# Patient Record
Sex: MALE | Race: WHITE | Employment: FULL TIME | URBAN - NONMETROPOLITAN AREA
[De-identification: names, ages, dates, MRNs, and addresses within clinical notes are randomized per-mention and may not be internally consistent; named-entity substitution may affect disease eponyms.]

---

## 2020-04-24 ENCOUNTER — HOSPITAL ENCOUNTER (EMERGENCY)
Age: 30
Discharge: HOME | End: 2020-04-24
Payer: COMMERCIAL

## 2020-04-24 VITALS
BODY MASS INDEX: 31.5 KG/M2 | SYSTOLIC BLOOD PRESSURE: 145 MMHG | OXYGEN SATURATION: 98 % | WEIGHT: 225 LBS | HEIGHT: 71 IN | DIASTOLIC BLOOD PRESSURE: 76 MMHG | TEMPERATURE: 98.3 F | HEART RATE: 78 BPM | RESPIRATION RATE: 16 BRPM

## 2020-04-24 PROCEDURE — 99202 OFFICE O/P NEW SF 15 MIN: CPT | Performed by: NURSE PRACTITIONER

## 2020-04-24 PROCEDURE — 99202 OFFICE O/P NEW SF 15 MIN: CPT

## 2020-04-24 RX ORDER — PROPARACAINE HYDROCHLORIDE 5 MG/ML
SOLUTION/ DROPS OPHTHALMIC
Status: DISCONTINUED
Start: 2020-04-24 | End: 2020-04-24 | Stop reason: HOSPADM

## 2020-04-24 RX ORDER — PROPARACAINE HYDROCHLORIDE 5 MG/ML
2 SOLUTION/ DROPS OPHTHALMIC ONCE
Status: DISCONTINUED | OUTPATIENT
Start: 2020-04-24 | End: 2020-04-24 | Stop reason: HOSPADM

## 2020-04-24 RX ORDER — TOBRAMYCIN 3 MG/ML
1 SOLUTION/ DROPS OPHTHALMIC EVERY 6 HOURS
Qty: 1 BOTTLE | Refills: 0 | Status: SHIPPED | OUTPATIENT
Start: 2020-04-24 | End: 2020-05-01

## 2020-04-24 ASSESSMENT — ENCOUNTER SYMPTOMS
EYE PAIN: 1
EYE ITCHING: 0
EYE DISCHARGE: 0
PHOTOPHOBIA: 0
EYE REDNESS: 1

## 2020-04-24 ASSESSMENT — PAIN SCALES - GENERAL: PAINLEVEL_OUTOF10: 8

## 2020-04-24 ASSESSMENT — VISUAL ACUITY
OD: 20/50
OS: 20/30
OU: 1

## 2020-04-24 ASSESSMENT — PAIN DESCRIPTION - FREQUENCY: FREQUENCY: CONTINUOUS

## 2020-04-24 ASSESSMENT — PAIN DESCRIPTION - LOCATION: LOCATION: EYE

## 2020-04-24 ASSESSMENT — PAIN DESCRIPTION - ORIENTATION: ORIENTATION: RIGHT

## 2020-04-24 ASSESSMENT — PAIN DESCRIPTION - PAIN TYPE: TYPE: ACUTE PAIN

## 2021-12-22 ENCOUNTER — HOSPITAL ENCOUNTER (EMERGENCY)
Age: 31
Discharge: HOME | End: 2021-12-22
Payer: COMMERCIAL

## 2021-12-22 VITALS
OXYGEN SATURATION: 98 % | HEART RATE: 78 BPM | RESPIRATION RATE: 16 BRPM | DIASTOLIC BLOOD PRESSURE: 80 MMHG | SYSTOLIC BLOOD PRESSURE: 140 MMHG | TEMPERATURE: 98.3 F

## 2021-12-22 DIAGNOSIS — J06.9 VIRAL URI WITH COUGH: Primary | ICD-10-CM

## 2021-12-22 DIAGNOSIS — Z20.822 ENCOUNTER FOR LABORATORY TESTING FOR COVID-19 VIRUS: ICD-10-CM

## 2021-12-22 PROCEDURE — 99213 OFFICE O/P EST LOW 20 MIN: CPT

## 2021-12-22 PROCEDURE — 87636 SARSCOV2 & INF A&B AMP PRB: CPT

## 2021-12-22 PROCEDURE — 99213 OFFICE O/P EST LOW 20 MIN: CPT | Performed by: NURSE PRACTITIONER

## 2021-12-22 ASSESSMENT — ENCOUNTER SYMPTOMS
VOMITING: 0
COUGH: 0
BACK PAIN: 0
SORE THROAT: 0
DIARRHEA: 0
SINUS PRESSURE: 0
TROUBLE SWALLOWING: 0
SHORTNESS OF BREATH: 0
NAUSEA: 0
RHINORRHEA: 0

## 2021-12-22 NOTE — ED NOTES
Patient understood instructions verbally,  Follow up with PCP with any concerns, work excuse with patient. Worse URI symptoms follow up with ED.ambulated self to lobby,stable condition. All belongings with patient.      Tim Galicia LPN  98/55/54 [id number]

## 2021-12-22 NOTE — ED PROVIDER NOTES
[place] [address]  Urgent Care Encounter       CHIEF COMPLAINT       Chief Complaint   Patient presents with    Cough    Congestion    Covid Testing       Nurses Notes reviewed and I agree except as noted in the HPI. HISTORY OF PRESENT ILLNESS   Mary Kay Munoz Carrier is a 27 y.o. male who presents urgent care center for a Covid test.  Patient stated that his wife tested positive he tested negative last night. Patient stated that he has had a slight cough but no fever, chills loss of taste or smell or any other symptoms. Patient requesting a Covid test.  (See HPI template)    The history is provided by the patient. No  was used. Cough  Cough characteristics:  Non-productive  Severity:  Mild  Onset quality:  Sudden  Duration:  3 days  Timing:  Constant  Progression:  Unchanged  Chronicity:  New  Smoker: no    Context: sick contacts    Context comment:  Wife has COVID  Relieved by:  Nothing  Worsened by:  Nothing  Ineffective treatments:  None tried  Associated symptoms: no chest pain, no chills, no ear pain, no fever, no headaches, no myalgias, no rash, no rhinorrhea, no shortness of breath and no sore throat        REVIEW OF SYSTEMS     Review of Systems   Constitutional: Negative for activity change, appetite change, chills, fatigue and fever. HENT: Positive for congestion. Negative for ear pain, rhinorrhea, sinus pressure, sore throat and trouble swallowing. Respiratory: Negative for cough and shortness of breath. Cardiovascular: Negative for chest pain. Gastrointestinal: Negative for diarrhea, nausea and vomiting. Musculoskeletal: Negative for back pain, myalgias and neck stiffness. Skin: Negative for rash. Allergic/Immunologic: Negative for environmental allergies. Neurological: Negative for dizziness, light-headedness and headaches. Hematological: Negative for adenopathy. PAST MEDICAL HISTORY   History reviewed.  No pertinent past medical history. SURGICALHISTORY     Patient  has no past surgical history on file. CURRENT MEDICATIONS       There are no discharge medications for this patient. ALLERGIES     Patient is has No Known Allergies. Patients   There is no immunization history on file for this patient. FAMILY HISTORY     Patient's family history is not on file. SOCIAL HISTORY     Patient  reports that he has never smoked. He has never used smokeless tobacco. He reports current alcohol use. He reports that he does not use drugs. PHYSICAL EXAM     ED TRIAGE VITALS  BP: (!) 140/80, Temp: 98.3 °F (36.8 °C), Pulse: 78, Resp: 16, SpO2: 98 %,Estimated body mass index is 31.38 kg/m² as calculated from the following:    Height as of 4/24/20: 5' 11\" (1.803 m). Weight as of 4/24/20: 225 lb (102.1 kg). ,No LMP for male patient. Physical Exam  Vitals and nursing note reviewed. Constitutional:       General: He is not in acute distress. Appearance: He is well-developed. He is not ill-appearing, toxic-appearing or diaphoretic. HENT:      Head: Normocephalic. Right Ear: Hearing, tympanic membrane, ear canal and external ear normal. Tympanic membrane is not erythematous. Left Ear: Hearing, tympanic membrane, ear canal and external ear normal. Tympanic membrane is not erythematous. Nose: No congestion or rhinorrhea. Right Turbinates: Not enlarged or swollen. Left Turbinates: Not enlarged or swollen. Mouth/Throat:      Lips: Pink. Mouth: Mucous membranes are moist.      Tongue: No lesions. Pharynx: Oropharynx is clear. Uvula midline. No pharyngeal swelling, oropharyngeal exudate, posterior oropharyngeal erythema or uvula swelling. Tonsils: No tonsillar exudate or tonsillar abscesses. Eyes:      Conjunctiva/sclera: Conjunctivae normal.      Pupils: Pupils are equal, round, and reactive to light. Cardiovascular:      Rate and Rhythm: Normal rate and regular rhythm.       Heart sounds: Normal heart sounds, S1 normal and S2 normal.   Pulmonary:      Effort: Pulmonary effort is normal. No accessory muscle usage. Breath sounds: Normal breath sounds. No decreased air movement. No decreased breath sounds, wheezing, rhonchi or rales. Musculoskeletal:      Cervical back: Full passive range of motion without pain, normal range of motion and neck supple. No rigidity. Lymphadenopathy:      Head:      Right side of head: No submental, submandibular, tonsillar, preauricular, posterior auricular or occipital adenopathy. Left side of head: No submental, submandibular, tonsillar, preauricular, posterior auricular or occipital adenopathy. Cervical: No cervical adenopathy. Right cervical: No superficial, deep or posterior cervical adenopathy. Left cervical: No superficial, deep or posterior cervical adenopathy. Skin:     General: Skin is warm and dry. Capillary Refill: Capillary refill takes less than 2 seconds. Neurological:      General: No focal deficit present. Mental Status: He is alert and oriented to person, place, and time. Psychiatric:         Mood and Affect: Mood normal.         Speech: Speech normal.         Behavior: Behavior normal. Behavior is cooperative. DIAGNOSTIC RESULTS     Labs:No results found for this visit on 12/22/21. IMAGING:    No orders to display         EKG:      URGENT CARE COURSE:     Vitals:    12/22/21 1143   BP: (!) 140/80   Pulse: 78   Resp: 16   Temp: 98.3 °F (36.8 °C)   TempSrc: Temporal   SpO2: 98%       Medications - No data to display         PROCEDURES:  None    FINAL IMPRESSION      1. Viral URI with cough    2. Encounter for laboratory testing for COVID-19 virus          DISPOSITION/ PLAN      The patient/Patient representative was advised to rest, drink lots of fluids, take Motrin and Tylenol for any fever, chills generalized body aches.   The patient was also advised to monitor urine output for signs of dehydration. If the patient develops any chest pain, shortness of breath, neck pain or stiffness or abdominal pain or any other concerns, the patient is to call 911 or go to the emergency department for further evaluation. If the patient does not experience any of the above symptoms he is to follow-up with his primary care provider in 2-3 days for reevaluation. The patient/Patient representative are agreeable to the treatment plan at this time. The patient left the urgent care center in stable condition. PATIENT REFERRED TO:  No primary care provider on file. No primary physician on file. DISCHARGE MEDICATIONS:  There are no discharge medications for this patient. There are no discharge medications for this patient. There are no discharge medications for this patient.       MITCH Templeton CNP    (Please note that portions of this note were completed with a voice recognition program. Efforts were made to edit the dictations but occasionally words are mis-transcribed.)           MITCH Templeton CNP  12/22/21 0631

## 2021-12-22 NOTE — ED TRIAGE NOTES
Elver Ramos arrives to room with complaint of cough congestion exposure to covid symptoms started 3 days ago. Elver Ramos wife has covid along with his child he did a home test last night that was neg he would like a pcr test to be certain he doesn't have covid.

## 2021-12-22 NOTE — LETTER
[facility]  [address] [place] [id number]  Phone: [phone]               December 22, 2021    Patient: Jeanine Baum   YOB: 1990   Date of Visit: 12/22/2021       To Whom It May Concern:    Madeleine Roth was seen and treated in our emergency department on 12/22/2021. He     The results may take 24 hours. If the patient test positive, they should be off work 10 days from the start of symptoms and fever free for 24 hours. If the patient needs FMLA papers filled out they must go through their primary care provider. If the results are negative, they can return to work with no restrictions. If there are questions or concerns, please have the patient contact our office.         Sincerely,       MITCH Grimm CNP         Signature:_____Electronically signed by MITCH Grimm CNP on 12/22/2021 at 12:08 PM]_____________________________

## 2021-12-23 ENCOUNTER — CARE COORDINATION (OUTPATIENT)
Dept: CARE COORDINATION | Age: 31
End: 2021-12-23

## 2021-12-23 LAB
INFLUENZA A: NOT DETECTED
INFLUENZA B: NOT DETECTED
SARS-COV-2 RNA, RT PCR: DETECTED

## 2021-12-23 NOTE — CARE COORDINATION
Attempted to reach patient for covid-19 f/u s/p recent UC visit for c/o cough, congestion and wishes for COVID-19 testing s/p recent exposure. COVID-19 testing was completed and results were positive. Patient was not available at the time of my call and generic voicemail message was left asking patient to please return call to my direct number. Will continue to attempt to f/u with patient in the future.

## 2021-12-23 NOTE — CARE COORDINATION
Patient contacted regarding COVID-19 exposure and diagnosis. Discussed COVID-19 related testing which was available at this time. Test results were positive. Patient informed of results, if available? Reviewed positive COVID-19 results with patient during our f/u call. Ambulatory Care Manager contacted the patient by telephone to perform post discharge assessment. Call within 2 business days of discharge: Yes. Verified name and  with patient as identifiers. Provided introduction to self, and explanation of the CTN/ACM role, and reason for call due to risk factors for infection and/or exposure to COVID-19. Symptoms reviewed with patient who verbalized the following symptoms: fatigue, cough, shortness of breath, no new symptoms and no worsening symptoms. Due to no new or worsening symptoms encounter was not routed to provider for escalation. Discussed follow-up appointments. If no appointment was previously scheduled, appointment scheduling offered: Patient agreeable to PCP f/u appointment and will ask Alfredito Bryant, to reach out to patient and assist with scheduling. [facility] follow up appointment(s): No future appointments. Non-[facility] follow up appointment(s): N/A     Non-face-to-face services provided:  Obtained and reviewed discharge summary and/or continuity of care documents  Education of patient/family/caregiver/guardian to support self-management-COVID-19 education and zone information reviewed with patient. Patient educated on signs/symptoms to report as well as the importance of early symptom recognition. ACM educated on the need to self quarantine as directed and encouraged patient to f/u with the health department. Advance Care Planning:   Does patient have an Advance Directive:  reviewed and current. ACP note completed. Educated patient about risk for severe COVID-19 due to risk factors according to CDC guidelines.  ACM reviewed discharge instructions, medical action plan and red flag symptoms with the patient who verbalized understanding. Discussed COVID vaccination status: No.   Discussed exposure protocols and quarantine with CDC Guidelines. Patient was given an opportunity to verbalize any questions and concerns and agrees to contact ACM or health care provider for questions related to their healthcare. Reviewed and educated patient on any new and changed medications related to discharge diagnosis     Was patient discharged with a pulse oximeter? No     ACM provided contact information. Plan for follow-up call in 3-5 days based on severity of symptoms and risk factors. Patient called for covid-19 f/u s/p recent  visit for c/o cough, congestion, fatigue, and COVID-19 testing s/p recent exposure. Patient denied any new/change/worsening of symptoms. Patient shared he is feeling \"about the same\" and he continues to have c/o congestion and increased fatigue. COVID-19 education and zone information was reviewed with patient. Patient was educated on signs/symptoms to report as well as the importance of early symptom recognition. Patient verbalized understanding. ACM reviewed need to self quarantine as directed and patient acknowledged understanding. Patient was reminded to call covid-19 hotline number with any new symptoms or questions/concerns. Patient verbalized understanding. Patient denied any other questions, concerns, or needs.

## 2021-12-27 ENCOUNTER — CARE COORDINATION (OUTPATIENT)
Dept: CARE COORDINATION | Age: 31
End: 2021-12-27

## 2021-12-27 NOTE — CARE COORDINATION
You Patient resolved from the Care Transitions episode on 12/27/2021  Discussed COVID-19 related testing which was available at this time. Test results were positive. Patient informed of results, if available? Yes    Patient/family has been provided the following resources and education related to COVID-19:                         Signs, symptoms and red flags related to COVID-19            CDC exposure and quarantine guidelines            [facility] exposure contact - [phone]            Contact for their local Department of Health                 Patient currently reports that the following symptoms have improved:  nasal congestion and no new/worsening symptoms     No further outreach scheduled with this CTN/ACM. Episode of Care resolved. Patient has this CTN/ACM contact information if future needs arise.

## 2021-12-30 ENCOUNTER — OFFICE VISIT (OUTPATIENT)
Dept: FAMILY MEDICINE CLINIC | Age: 31
End: 2021-12-30
Payer: COMMERCIAL

## 2021-12-30 DIAGNOSIS — U07.1 COVID-19: Primary | ICD-10-CM

## 2021-12-30 PROCEDURE — 99203 OFFICE O/P NEW LOW 30 MIN: CPT | Performed by: NURSE PRACTITIONER

## 2021-12-30 NOTE — PROGRESS NOTES
2021    TELEHEALTH EVALUATION -- Audio/Visual (During WSLHD-80 public health emergency)    HPI:    Pinky Ortega Carrier (:  1990) has requested an audio/video evaluation for   Chief Complaint   Patient presents with   [place] follow up   :        HPI:      Patient tested positive for COVID on  at [address]. Symptoms have been present for 11 day(s). Symptoms are better since they initially started. Fever? No  Runny nose or congestion? Yes   Cough? No  Sore throat? No  Shortness of breath/Wheezing? No  Other associated symptoms?  none      Smoker? No  Preexisting Respiratory conditions?  none        There is no problem list on file for this patient. No Known Allergies    No current outpatient medications on file prior to visit. No current facility-administered medications on file prior to visit. PHYSICAL EXAMINATION:  Appearance: alert, well appearing, and in no distress, normal appearing weight and well hydrated. Psych:  Affect appropriate. Thought process is normal without evidence of depression or psychosis. Good insight and appropriae interaction. Cognition and memory appear to be intact. ASSESSMENT & PLAN  Umesh Nicholson was seen today for other. Diagnoses and all orders for this visit:    COVID-19      Symptoms have mostly resolved  Follow up prn    No follow-ups on file.    -Pt appears stable at this time, will continue outpatient tx  -Continue current treatments including OTC supplements (Zinc, Vit D, C)  -Patient advised to contact our office immediately if symptoms worsen or persist.  ER precautions given to patient today  -Patient counseled on conservative care including fluids, rest and OTC meds    An  electronic signature was used to authenticate this note.     --La Corbett, MITCH - CNP on 2021 at 11:56 AM    {    Pursuant to the emergency declaration under the [facility]5 waiver authority and the Coronavirus Preparedness and Response Supplemental Appropriations Act, this Virtual  Visit was conducted, with patient's consent, to reduce the patient's risk of exposure to COVID-19 and provide continuity of care for an established patient. Services were provided through a video synchronous discussion virtually to substitute for in-person clinic visit.

## 2022-02-17 ENCOUNTER — HOSPITAL ENCOUNTER (EMERGENCY)
Age: 32
Discharge: HOME | End: 2022-02-17
Payer: COMMERCIAL

## 2022-02-17 VITALS
RESPIRATION RATE: 16 BRPM | DIASTOLIC BLOOD PRESSURE: 78 MMHG | TEMPERATURE: 98.4 F | OXYGEN SATURATION: 98 % | HEART RATE: 78 BPM | SYSTOLIC BLOOD PRESSURE: 122 MMHG

## 2022-02-17 DIAGNOSIS — S61.211A LACERATION WITHOUT FOREIGN BODY OF LEFT INDEX FINGER WITHOUT DAMAGE TO NAIL, INITIAL ENCOUNTER: Primary | ICD-10-CM

## 2022-02-17 PROCEDURE — 90471 IMMUNIZATION ADMIN: CPT | Performed by: EMERGENCY MEDICINE

## 2022-02-17 PROCEDURE — 12001 RPR S/N/AX/GEN/TRNK 2.5CM/<: CPT | Performed by: EMERGENCY MEDICINE

## 2022-02-17 PROCEDURE — 90715 TDAP VACCINE 7 YRS/> IM: CPT | Performed by: EMERGENCY MEDICINE

## 2022-02-17 PROCEDURE — 12001 RPR S/N/AX/GEN/TRNK 2.5CM/<: CPT

## 2022-02-17 PROCEDURE — 6370000000 HC RX 637 (ALT 250 FOR IP): Performed by: EMERGENCY MEDICINE

## 2022-02-17 PROCEDURE — 6360000002 HC RX W HCPCS: Performed by: EMERGENCY MEDICINE

## 2022-02-17 PROCEDURE — 99213 OFFICE O/P EST LOW 20 MIN: CPT

## 2022-02-17 RX ORDER — DIAPER,BRIEF,INFANT-TODD,DISP
EACH MISCELLANEOUS ONCE
Status: COMPLETED | OUTPATIENT
Start: 2022-02-17 | End: 2022-02-17

## 2022-02-17 RX ORDER — LIDOCAINE HYDROCHLORIDE 10 MG/ML
5 INJECTION, SOLUTION INFILTRATION; PERINEURAL ONCE
Status: DISCONTINUED | OUTPATIENT
Start: 2022-02-17 | End: 2022-02-17 | Stop reason: HOSPADM

## 2022-02-17 RX ADMIN — BACITRACIN ZINC: 500 OINTMENT TOPICAL at 10:39

## 2022-02-17 RX ADMIN — TETANUS TOXOID, REDUCED DIPHTHERIA TOXOID AND ACELLULAR PERTUSSIS VACCINE, ADSORBED 0.5 ML: 5; 2.5; 8; 8; 2.5 SUSPENSION INTRAMUSCULAR at 10:01

## 2022-02-17 ASSESSMENT — ENCOUNTER SYMPTOMS
COUGH: 0
SHORTNESS OF BREATH: 0

## 2022-02-17 NOTE — ED PROVIDER NOTES
[facility]       CHIEF COMPLAINT       Chief Complaint   Patient presents with    Laceration       Nurses Notes reviewed and I agree except as noted in the HPI. HISTORY OF PRESENT ILLNESS   Bruce Baum is a 27 y.o. male who presents for laceration to the left index finger. States he occurred 1 hour prior to arrival.  States there was a  that was left open at his place of employment. He states he went to close the  with the hinge was stuck. He had to forcefully press on both sides of the  to close it, it slipped and caused a laceration to his left index finger. He has the wound bandaged and bleeding appears controlled at this time. He is unsure when his last tetanus. HPI    REVIEW OF SYSTEMS     Review of Systems   Constitutional: Negative for fatigue and fever. Respiratory: Negative for cough and shortness of breath. Skin: Positive for wound (laceration left index finger). Neurological: Negative for dizziness, light-headedness and headaches. PAST MEDICAL HISTORY   History reviewed. No pertinent past medical history. SURGICALHISTORY     Patient  has no past surgical history on file. CURRENT MEDICATIONS       Previous Medications    No medications on file       ALLERGIES     Patient is has No Known Allergies. Patients   Immunization History   Administered Date(s) Administered    Tdap (Boostrix, Adacel) 02/17/2022       FAMILY HISTORY     Patient's family history is not on file. SOCIAL HISTORY     Patient  reports that he has never smoked. He has never used smokeless tobacco. He reports current alcohol use. He reports that he does not use drugs. PHYSICAL EXAM     ED TRIAGE VITALS   ,  ,  ,  ,  ,Estimated body mass index is 31.38 kg/m² as calculated from the following:    Height as of 4/24/20: 5' 11\" (1.803 m). Weight as of 4/24/20: 225 lb (102.1 kg). ,No LMP for male patient.     Physical Exam  Constitutional:       General: He is not in acute distress. Appearance: He is normal weight. He is not ill-appearing. Cardiovascular:      Rate and Rhythm: Normal rate and regular rhythm. Pulses: Normal pulses. Heart sounds: Normal heart sounds. Pulmonary:      Effort: Pulmonary effort is normal.      Breath sounds: Normal breath sounds. Musculoskeletal:      Right hand: Laceration present. Hands:       Comments: Approximate 2.5 cm laceration to the palmar aspect of the left index finger at the DIP joint. Full flexion of the finger. No tendon exposure. Bleeding is controlled   Skin:     General: Skin is dry. Capillary Refill: Capillary refill takes less than 2 seconds. Neurological:      General: No focal deficit present. Mental Status: He is alert. Psychiatric:         Mood and Affect: Mood normal.         Behavior: Behavior normal.         DIAGNOSTIC RESULTS     Labs:No results found for this visit on 02/17/22. IMAGING:    No orders to display         EKG:      URGENT CARE COURSE:     There were no vitals filed for this visit. Medications   lidocaine 1 % injection 5 mL (has no administration in time range)   bacitracin zinc ointment (has no administration in time range)   Tetanus-Diphth-Acell Pertussis (BOOSTRIX) injection 0.5 mL (0.5 mLs IntraMUSCular Given 2/17/22 1001)            PROCEDURES:  Lac Repair    Date/Time: 2/17/2022 10:28 AM  Performed by: MITCH Bradshaw CNP  Authorized by: MITCH Bradshaw CNP     Consent:     Consent obtained:  Verbal    Consent given by:  Patient    Risks discussed:  Infection, pain, need for additional repair, nerve damage, poor cosmetic result, poor wound healing, retained foreign body and tendon damage  Anesthesia (see MAR for exact dosages):      Anesthesia method:  Local infiltration    Local anesthetic:  Lidocaine 1% w/o epi  Laceration details:     Location:  Finger    Finger location:  L index finger Length (cm):  2.5  Repair type:     Repair type:  Simple  Pre-procedure details:     Preparation:  Patient was prepped and draped in usual sterile fashion  Exploration:     Hemostasis achieved with:  Tourniquet    Wound exploration: wound explored through full range of motion and entire depth of wound probed and visualized      Wound extent: no foreign bodies/material noted, no muscle damage noted, no nerve damage noted, no tendon damage noted, no underlying fracture noted and no vascular damage noted      Contaminated: no    Treatment:     Area cleansed with:  Ida-Gallito    Amount of cleaning:  Standard    Irrigation method:  Pressure wash  Skin repair:     Repair method:  Sutures    Suture size:  4-0    Suture material:  Nylon    Suture technique:  Simple interrupted    Number of sutures:  4  Approximation:     Approximation:  Close  Post-procedure details:     Dressing:  Antibiotic ointment and non-adherent dressing    Patient tolerance of procedure: Tolerated well, no immediate complications        FINAL IMPRESSION      1. Laceration without foreign body of left index finger without damage to nail, initial encounter          DISPOSITION/ PLAN   Presents for laceration of the left index finger. This was thoroughly cleansed and repaired using simple interrupted sutures. Full range of motion of finger with no apparent flexor tendon injury. This was covered with antibiotic ointment and dry sterile dressing. Patient will be given restrictions for work for no left-handed use for 2 days and then will follow up with occupational health in 7 to 10 days for suture removal.        PATIENT REFERRED TO:  No primary care provider on file. No primary physician on file. DISCHARGE MEDICATIONS:  New Prescriptions    No medications on file       Discontinued Medications    No medications on file       There are no discharge medications for this patient.       Kezia White, MITCH - CNP    (Please note that portions of this note were completed with a voice recognition program. Efforts were made to edit the dictations but occasionally words are mis-transcribed.)          MITCH Agustin - FACUNDO  02/17/22 [id number]

## 2022-02-17 NOTE — ED TRIAGE NOTES
Ciaran Salter arrives to room with complaint of lac to left index finger occurred today while closing a knife at work at [employer] car.

## 2023-05-12 ENCOUNTER — HOSPITAL ENCOUNTER (EMERGENCY)
Age: 33
Discharge: HOME | End: 2023-05-12

## 2023-05-12 ENCOUNTER — APPOINTMENT (OUTPATIENT)
Dept: GENERAL RADIOLOGY | Age: 33
End: 2023-05-12

## 2023-05-12 ENCOUNTER — NURSE TRIAGE (OUTPATIENT)
Dept: OTHER | Facility: CLINIC | Age: 33
End: 2023-05-12

## 2023-05-12 VITALS
TEMPERATURE: 97.7 F | RESPIRATION RATE: 16 BRPM | OXYGEN SATURATION: 95 % | HEART RATE: 64 BPM | SYSTOLIC BLOOD PRESSURE: 155 MMHG | DIASTOLIC BLOOD PRESSURE: 100 MMHG

## 2023-05-12 DIAGNOSIS — S91.331A PUNCTURE WOUND OF RIGHT FOOT, INITIAL ENCOUNTER: Primary | ICD-10-CM

## 2023-05-12 PROCEDURE — 90715 TDAP VACCINE 7 YRS/> IM: CPT | Performed by: NURSE PRACTITIONER

## 2023-05-12 PROCEDURE — 6360000002 HC RX W HCPCS: Performed by: NURSE PRACTITIONER

## 2023-05-12 PROCEDURE — 73630 X-RAY EXAM OF FOOT: CPT

## 2023-05-12 PROCEDURE — 99213 OFFICE O/P EST LOW 20 MIN: CPT | Performed by: NURSE PRACTITIONER

## 2023-05-12 PROCEDURE — 90471 IMMUNIZATION ADMIN: CPT | Performed by: NURSE PRACTITIONER

## 2023-05-12 PROCEDURE — 99213 OFFICE O/P EST LOW 20 MIN: CPT

## 2023-05-12 RX ORDER — CEPHALEXIN 500 MG/1
500 CAPSULE ORAL 4 TIMES DAILY
Qty: 28 CAPSULE | Refills: 0 | Status: SHIPPED | OUTPATIENT
Start: 2023-05-12 | End: 2023-05-19

## 2023-05-12 RX ADMIN — TETANUS TOXOID, REDUCED DIPHTHERIA TOXOID AND ACELLULAR PERTUSSIS VACCINE, ADSORBED 0.5 ML: 5; 2.5; 8; 8; 2.5 SUSPENSION INTRAMUSCULAR at 15:30

## 2023-05-12 ASSESSMENT — PAIN DESCRIPTION - ORIENTATION: ORIENTATION: RIGHT

## 2023-05-12 ASSESSMENT — ENCOUNTER SYMPTOMS
RHINORRHEA: 0
NAUSEA: 0
COUGH: 0
SINUS PRESSURE: 0
CONSTIPATION: 0
EYE PAIN: 0
WHEEZING: 0
BLOOD IN STOOL: 0
VOMITING: 0
ABDOMINAL PAIN: 0
DIARRHEA: 0
SHORTNESS OF BREATH: 0

## 2023-05-12 ASSESSMENT — PAIN DESCRIPTION - LOCATION: LOCATION: FOOT

## 2023-05-12 ASSESSMENT — PAIN DESCRIPTION - PAIN TYPE: TYPE: ACUTE PAIN

## 2023-05-12 ASSESSMENT — PAIN DESCRIPTION - FREQUENCY: FREQUENCY: CONTINUOUS

## 2023-05-12 ASSESSMENT — PAIN DESCRIPTION - DESCRIPTORS: DESCRIPTORS: DULL;THROBBING

## 2023-05-12 ASSESSMENT — PAIN - FUNCTIONAL ASSESSMENT
PAIN_FUNCTIONAL_ASSESSMENT: 0-10
PAIN_FUNCTIONAL_ASSESSMENT: PREVENTS OR INTERFERES SOME ACTIVE ACTIVITIES AND ADLS

## 2023-05-12 ASSESSMENT — PAIN SCALES - GENERAL: PAINLEVEL_OUTOF10: 2

## 2023-05-12 NOTE — ED PROVIDER NOTES
puncture wound with scant bleeding noted to the bottom of his foot. It did not go through the foot completely. Patient is unsure when his last tetanus shot was. This is updated during his stay today. X-ray completed to rule out involvement of bone. X-ray is reassuring. Patient will be started on oral Keflex for prophylactic treatment. Patient instructed to go to ER for worsening symptoms, chest pain, shortness of breath, puslike drainage from wound, inability to keep liquids down, inability to urinate for greater than 8 hours or difficulty breathing. Keep wound clean and dry. Follow-up with your primary care provider. Keep wound clean and dry. Apply triple antibiotic to the wound twice a day. Medications   Tetanus-Diphth-Acell Pertussis (BOOSTRIX) injection 0.5 mL (0.5 mLs IntraMUSCular Given 5/12/23 1530)     PROCEDURES:    Procedures    FINALIMPRESSION      1. Puncture wound of right foot, initial encounter        DISPOSITION/PLAN   DISPOSITION Decision To Discharge 05/12/2023 03:41:35 PM    PATIENT REFERRED TO:  [address]  Schedule an appointment as soon as possible for a visit       DISCHARGE MEDICATIONS:  New Prescriptions    CEPHALEXIN (KEFLEX) 500 MG CAPSULE    Take 1 capsule by mouth 4 times daily for 7 days     Current Discharge Medication List          MITCH Mason CNP, APRN - CNP  05/12/23 5536

## 2023-05-12 NOTE — TELEPHONE ENCOUNTER
Location of patient: [place]    Subjective: Caller states \"stepped on a agnieszka nail with boots. \"     Current Symptoms: outer right side of right foot puncture wound- stepped on     Onset: about 10 minutes ago; sudden    Associated Symptoms: NA    Pain Severity: 3/10; dull; constant    Temperature: n/a     What has been tried: cleaned it with peroxide     LMP: NA Pregnant: NA    Recommended disposition: See HCP within 4 Hours (or PCP triage)    Care advice provided, patient verbalizes understanding; denies any other questions or concerns; instructed to call back for any new or worsening symptoms. Patient/caller agrees to proceed to nearest [facility]     This triage is a result of a call to [address] [place] [address]. Please do not respond to the triage nurse through this encounter. Any subsequent communication should be directly with the patient.             Reason for Disposition   [1] Puncture wound of foot AND [2] puncture went through shoe (e.g., tennis shoe)    Protocols used: Puncture Wound-ADULT-

## 2023-05-12 NOTE — ED NOTES
Patient presents to [facility] by self with complaints of puncture wound to his right foot. Patient states he was cleaning out his fire pit and stepped on a piece of wood with a agnieszka nail. Patient reports needing a tetanus shot.  Wound cleansed with wound       Eric Hidalgo RN  05/12/23 7827

## 2023-05-12 NOTE — DISCHARGE INSTRUCTIONS
Go to ER for worsening symptoms, chest pain, shortness of breath, puslike drainage from wound, inability to keep liquids down, inability to urinate for greater than 8 hours or difficulty breathing. Keep wound clean and dry. Follow-up with your primary care provider. Keep wound clean and dry. Apply triple antibiotic to the wound twice a day.

## 2023-07-26 ENCOUNTER — HOSPITAL ENCOUNTER (EMERGENCY)
Age: 33
Discharge: HOME | End: 2023-07-26

## 2023-07-26 VITALS
DIASTOLIC BLOOD PRESSURE: 94 MMHG | RESPIRATION RATE: 18 BRPM | HEART RATE: 90 BPM | TEMPERATURE: 97.8 F | OXYGEN SATURATION: 96 % | SYSTOLIC BLOOD PRESSURE: 150 MMHG | WEIGHT: 230 LBS | HEIGHT: 71 IN | BODY MASS INDEX: 32.2 KG/M2

## 2023-07-26 DIAGNOSIS — T67.5XXA HEAT EXHAUSTION, INITIAL ENCOUNTER: Primary | ICD-10-CM

## 2023-07-26 LAB
BILIRUB UR STRIP.AUTO-MCNC: NEGATIVE MG/DL
CHARACTER UR: CLEAR
COLOR: YELLOW
GLUCOSE BLD STRIP.AUTO-MCNC: 89 MG/DL (ref 70–108)
GLUCOSE UR QL STRIP.AUTO: NEGATIVE MG/DL
KETONES UR QL STRIP.AUTO: 15
NITRITE UR QL STRIP.AUTO: NEGATIVE
PH UR STRIP.AUTO: 6 [PH] (ref 5–9)
PROT UR STRIP.AUTO-MCNC: NEGATIVE MG/DL
RBC #/AREA URNS HPF: NEGATIVE /[HPF]
SP GR UR STRIP.AUTO: 1.02 (ref 1–1.03)
UROBILINOGEN, URINE: 2 EU/DL (ref 0.2–1)
WBC #/AREA URNS HPF: NEGATIVE /[HPF]

## 2023-07-26 PROCEDURE — 81003 URINALYSIS AUTO W/O SCOPE: CPT

## 2023-07-26 PROCEDURE — 99213 OFFICE O/P EST LOW 20 MIN: CPT

## 2023-07-26 PROCEDURE — 6370000000 HC RX 637 (ALT 250 FOR IP)

## 2023-07-26 PROCEDURE — 82948 REAGENT STRIP/BLOOD GLUCOSE: CPT

## 2023-07-26 RX ORDER — MECLIZINE HCL 25MG 25 MG/1
25 TABLET, CHEWABLE ORAL ONCE
Status: COMPLETED | OUTPATIENT
Start: 2023-07-26 | End: 2023-07-26

## 2023-07-26 RX ORDER — MECLIZINE HCL 25MG 25 MG/1
25 TABLET, CHEWABLE ORAL 3 TIMES DAILY PRN
Qty: 21 TABLET | Refills: 0 | Status: SHIPPED | OUTPATIENT
Start: 2023-07-26

## 2023-07-26 RX ADMIN — MECLIZINE HYDROCHLORIDE 25 MG: 25 TABLET, CHEWABLE ORAL at 19:39

## 2023-07-26 ASSESSMENT — PAIN - FUNCTIONAL ASSESSMENT: PAIN_FUNCTIONAL_ASSESSMENT: 0-10

## 2023-07-26 ASSESSMENT — ENCOUNTER SYMPTOMS
SORE THROAT: 0
VOMITING: 0
SHORTNESS OF BREATH: 0
ABDOMINAL PAIN: 0
DIARRHEA: 0
NAUSEA: 1

## 2023-07-26 ASSESSMENT — PAIN SCALES - GENERAL: PAINLEVEL_OUTOF10: 5

## 2023-07-26 ASSESSMENT — PAIN DESCRIPTION - LOCATION: LOCATION: GENERALIZED

## 2023-07-26 ASSESSMENT — PAIN DESCRIPTION - DESCRIPTORS: DESCRIPTORS: ACHING

## 2023-07-26 NOTE — ED PROVIDER NOTES
[address]  Urgent Care Encounter       CHIEF COMPLAINT       Chief Complaint   Patient presents with    Generalized Body Aches    Nausea       Nurses Notes reviewed and I agree except as noted in the HPI. HISTORY OF PRESENT ILLNESS   Jessica Baum is a 34 y.o. male who presents with complaints of nausea, body aches, flank pain, for the last week. Patient reports he started a new job 3 weeks ago. Patient reports that when he is at work he feels hot, nauseous and dizzy. Patient reports that he feels flushed in different temperatures. Patient denies fever. Patient reports flank pain last week with dark colored urine. Patient reports drinking lots of fluids constantly around 5 big jugs of water a day. Patient denies weight loss or weight gain. The history is provided by the patient. REVIEW OF SYSTEMS     Review of Systems   Constitutional:  Positive for diaphoresis. Negative for fatigue and fever. HENT:  Negative for congestion and sore throat. Respiratory:  Negative for shortness of breath. Cardiovascular:  Negative for chest pain. Gastrointestinal:  Positive for nausea. Negative for abdominal pain, diarrhea and vomiting. Endocrine: Positive for cold intolerance, heat intolerance and polydipsia. Musculoskeletal:  Positive for myalgias. Neurological:  Positive for dizziness and headaches. Negative for weakness. All other systems reviewed and are negative. PAST MEDICAL HISTORY   History reviewed. No pertinent past medical history. SURGICALHISTORY     Patient  has no past surgical history on file. CURRENT MEDICATIONS       Discharge Medication List as of 7/26/2023  7:38 PM          ALLERGIES     Patient is has No Known Allergies. Patients   Immunization History   Administered Date(s) Administered    TDaP, ADACEL (age 6y-58y), Adrian Shah (age 10y+), IM, 0.5mL 02/17/2022, 05/12/2023       FAMILY HISTORY     Patient's family history is not on file.     SOCIAL

## 2023-07-26 NOTE — DISCHARGE INSTRUCTIONS
Antivert as needed for nausea or dizziness  Follow up with [facility] residency clinic  Drink electrolyte fluids  Avoid excessive heat expoure

## 2023-07-26 NOTE — ED NOTES
Pt with complaints of body aches, nausea and chills that started 1 week ago. States his daughter has a cold and ear infection at this time. States pain is in all joints and comes and goes.      Kitty Cope, LPN  39/81/33 [id number]

## 2023-07-26 NOTE — ED NOTES
Patient understood instructions verbally,  Follow up with PCP with any concerns, e-script,Worse symptoms follow up with ED.ambulated self to lobby,stable condition. All belongings with patient.       Ruby Aguilera LPN  62/96/87 [id number]

## 2023-09-14 ENCOUNTER — APPOINTMENT (OUTPATIENT)
Dept: ULTRASOUND IMAGING | Age: 33
End: 2023-09-14

## 2023-09-14 ENCOUNTER — HOSPITAL ENCOUNTER (EMERGENCY)
Age: 33
Discharge: HOME | End: 2023-09-14
Attending: EMERGENCY MEDICINE

## 2023-09-14 VITALS
DIASTOLIC BLOOD PRESSURE: 82 MMHG | OXYGEN SATURATION: 100 % | TEMPERATURE: 98.3 F | HEIGHT: 72 IN | HEART RATE: 81 BPM | SYSTOLIC BLOOD PRESSURE: 143 MMHG | BODY MASS INDEX: 32.51 KG/M2 | WEIGHT: 240 LBS | RESPIRATION RATE: 20 BRPM

## 2023-09-14 DIAGNOSIS — I86.1 VARICOCELE: Primary | ICD-10-CM

## 2023-09-14 LAB
ALBUMIN SERPL BCG-MCNC: 4.9 G/DL (ref 3.5–5.1)
ALP SERPL-CCNC: 59 U/L (ref 38–126)
ALT SERPL W/O P-5'-P-CCNC: 39 U/L (ref 11–66)
ANION GAP SERPL CALC-SCNC: 11 MEQ/L (ref 8–16)
AST SERPL-CCNC: 28 U/L (ref 5–40)
BASOPHILS ABSOLUTE: 0 THOU/MM3 (ref 0–0.1)
BASOPHILS NFR BLD AUTO: 0.7 %
BILIRUB CONJ SERPL-MCNC: < 0.2 MG/DL (ref 0–0.3)
BILIRUB SERPL-MCNC: 0.5 MG/DL (ref 0.3–1.2)
BILIRUB UR QL STRIP: NEGATIVE
BUN SERPL-MCNC: 12 MG/DL (ref 7–22)
CALCIUM SERPL-MCNC: 9.5 MG/DL (ref 8.5–10.5)
CHARACTER UR: CLEAR
CHLORIDE SERPL-SCNC: 99 MEQ/L (ref 98–111)
CO2 SERPL-SCNC: 29 MEQ/L (ref 23–33)
COLOR UR: YELLOW
CREAT SERPL-MCNC: 0.7 MG/DL (ref 0.4–1.2)
DEPRECATED RDW RBC AUTO: 41.6 FL (ref 35–45)
EOSINOPHIL NFR BLD AUTO: 5.3 %
EOSINOPHILS ABSOLUTE: 0.3 THOU/MM3 (ref 0–0.4)
ERYTHROCYTE [DISTWIDTH] IN BLOOD BY AUTOMATED COUNT: 12.6 % (ref 11.5–14.5)
GFR SERPL CREATININE-BSD FRML MDRD: > 60 ML/MIN/1.73M2
GLUCOSE SERPL-MCNC: 86 MG/DL (ref 70–108)
GLUCOSE UR QL STRIP.AUTO: NEGATIVE MG/DL
HCT VFR BLD AUTO: 45.4 % (ref 42–52)
HGB BLD-MCNC: 15.2 GM/DL (ref 14–18)
HGB UR QL STRIP.AUTO: NEGATIVE
IMM GRANULOCYTES # BLD AUTO: 0.02 THOU/MM3 (ref 0–0.07)
IMM GRANULOCYTES NFR BLD AUTO: 0.3 %
KETONES UR QL STRIP.AUTO: 40
LEUKOCYTE ESTERASE UR QL STRIP.AUTO: NEGATIVE
LIPASE SERPL-CCNC: 23.7 U/L (ref 5.6–51.3)
LYMPHOCYTES ABSOLUTE: 1.1 THOU/MM3 (ref 1–4.8)
LYMPHOCYTES NFR BLD AUTO: 18.4 %
MCH RBC QN AUTO: 30.3 PG (ref 26–33)
MCHC RBC AUTO-ENTMCNC: 33.5 GM/DL (ref 32.2–35.5)
MCV RBC AUTO: 90.6 FL (ref 80–94)
MONOCYTES ABSOLUTE: 0.6 THOU/MM3 (ref 0.4–1.3)
MONOCYTES NFR BLD AUTO: 10.9 %
NEUTROPHILS NFR BLD AUTO: 64.4 %
NITRITE UR QL STRIP.AUTO: NEGATIVE
NRBC BLD AUTO-RTO: 0 /100 WBC
OSMOLALITY SERPL CALC.SUM OF ELEC: 276.6 MOSMOL/KG (ref 275–300)
PH UR STRIP.AUTO: 6 [PH] (ref 5–9)
PLATELET # BLD AUTO: 187 THOU/MM3 (ref 130–400)
PMV BLD AUTO: 9.4 FL (ref 9.4–12.4)
POTASSIUM SERPL-SCNC: 4.2 MEQ/L (ref 3.5–5.2)
PROT SERPL-MCNC: 7.2 G/DL (ref 6.1–8)
PROT UR STRIP.AUTO-MCNC: NEGATIVE MG/DL
RBC # BLD AUTO: 5.01 MILL/MM3 (ref 4.7–6.1)
SEGMENTED NEUTROPHILS ABSOLUTE COUNT: 3.7 THOU/MM3 (ref 1.8–7.7)
SODIUM SERPL-SCNC: 139 MEQ/L (ref 135–145)
SP GR UR REFRACT.AUTO: 1.02 (ref 1–1.03)
UROBILINOGEN UR QL STRIP.AUTO: 0.2 EU/DL (ref 0–1)
WBC # BLD AUTO: 5.8 THOU/MM3 (ref 4.8–10.8)

## 2023-09-14 PROCEDURE — 83690 ASSAY OF LIPASE: CPT

## 2023-09-14 PROCEDURE — 81003 URINALYSIS AUTO W/O SCOPE: CPT

## 2023-09-14 PROCEDURE — 99215 OFFICE O/P EST HI 40 MIN: CPT

## 2023-09-14 PROCEDURE — 6360000002 HC RX W HCPCS: Performed by: STUDENT IN AN ORGANIZED HEALTH CARE EDUCATION/TRAINING PROGRAM

## 2023-09-14 PROCEDURE — 82248 BILIRUBIN DIRECT: CPT

## 2023-09-14 PROCEDURE — 85025 COMPLETE CBC W/AUTO DIFF WBC: CPT

## 2023-09-14 PROCEDURE — 36415 COLL VENOUS BLD VENIPUNCTURE: CPT

## 2023-09-14 PROCEDURE — 96372 THER/PROPH/DIAG INJ SC/IM: CPT

## 2023-09-14 PROCEDURE — 76870 US EXAM SCROTUM: CPT

## 2023-09-14 PROCEDURE — 80053 COMPREHEN METABOLIC PANEL: CPT

## 2023-09-14 PROCEDURE — 99284 EMERGENCY DEPT VISIT MOD MDM: CPT

## 2023-09-14 RX ORDER — KETOROLAC TROMETHAMINE 30 MG/ML
30 INJECTION, SOLUTION INTRAMUSCULAR; INTRAVENOUS ONCE
Status: COMPLETED | OUTPATIENT
Start: 2023-09-14 | End: 2023-09-14

## 2023-09-14 RX ADMIN — KETOROLAC TROMETHAMINE 30 MG: 30 INJECTION, SOLUTION INTRAMUSCULAR; INTRAVENOUS at 21:13

## 2023-09-14 ASSESSMENT — PAIN SCALES - GENERAL
PAINLEVEL_OUTOF10: 7
PAINLEVEL_OUTOF10: 5

## 2023-09-14 ASSESSMENT — PAIN - FUNCTIONAL ASSESSMENT
PAIN_FUNCTIONAL_ASSESSMENT: 0-10
PAIN_FUNCTIONAL_ASSESSMENT: PREVENTS OR INTERFERES SOME ACTIVE ACTIVITIES AND ADLS
PAIN_FUNCTIONAL_ASSESSMENT: 0-10

## 2023-09-14 ASSESSMENT — PAIN DESCRIPTION - PAIN TYPE: TYPE: ACUTE PAIN

## 2023-09-14 ASSESSMENT — ENCOUNTER SYMPTOMS
SHORTNESS OF BREATH: 0
ABDOMINAL PAIN: 1
DIARRHEA: 0
VOMITING: 0
COUGH: 0
BACK PAIN: 1
RESPIRATORY NEGATIVE: 1
ALLERGIC/IMMUNOLOGIC NEGATIVE: 1
EYES NEGATIVE: 1
NAUSEA: 0
ABDOMINAL PAIN: 0

## 2023-09-14 ASSESSMENT — PAIN DESCRIPTION - DESCRIPTORS: DESCRIPTORS: ACHING;DULL

## 2023-09-14 ASSESSMENT — PAIN DESCRIPTION - ORIENTATION: ORIENTATION: RIGHT

## 2023-09-14 ASSESSMENT — PAIN DESCRIPTION - LOCATION: LOCATION: OTHER (COMMENT)

## 2023-09-14 NOTE — DISCHARGE INSTRUCTIONS
Come back to the emergency department if severe chest pain, severe shortness of breath, persistence of fever, intractable vomiting, severe dizziness or lightheadedness, fainting. Schedule an appointment with your primary care physician as recommended previously. Read the documents attached.    You can take over-the-counter analgesics

## 2023-09-14 NOTE — ED TRIAGE NOTES
Patient presents to [facility] with complaints of right testicular swelling noticed today. Patient reports pain in this testicle and the intensity changes depending on if patient is sitting or standing. No injury noted.

## 2023-09-14 NOTE — ED NOTES
Pt arrives to ED from urgent care with c/o lower abdominal pain and right testicular swelling and pain.  Pt reports decreased appetite, states abdominal pain is worse when standing for long periods        Jonnathan Roque RN  09/14/23 2179

## 2023-09-15 NOTE — ED PROVIDER NOTES
[address] [facility]  EMERGENCY MEDICINE ATTENDING ATTESTATION      Evaluation of Jameson Baum. Case discussed and care plan developed with resident physician. I agree with the resident physician documentation and plan as documented by him, except if my documentation differs. Patient seen under indirect supervision. I reviewed the medical, surgical, family and social history, medications and allergies. I have reviewed and interpreted all available lab, radiology and ekg results available at the moment. I have reviewed the nursing documentation. Please see the resident physician completed note for final disposition except as documented on this attestation. I have reviewed and interpreted all available lab, radiology and ekg results available at the moment. Diagnosis, treatment and disposition plans were discussed and agreed upon by patient. This transcription was electronically signed. It was dictated by use of voice recognition software and electronically transcribed. The transcription may contain errors not detected in proofreading.      I performed direct supervision and was present for the critical portion following procedures: None  Critical care time on this case: None    Electronically signed by Madelyn Stein MD on 9/14/23 at 8:47 PM EDT       Madelyn Stein MD  09/14/23 2108

## 2023-09-15 NOTE — ED NOTES
Patient resting in bed. Respirations easy and unlabored. No distress noted. Call light within reach.        Benton Roe RN  09/14/23 2122

## 2023-09-15 NOTE — ED NOTES
Patient resting in bed. Respirations easy and unlabored. No distress noted. Call light within reach.        Brady Pepe RN  09/14/23 2018

## 2024-10-23 ENCOUNTER — HOSPITAL ENCOUNTER (EMERGENCY)
Age: 34
Discharge: HOME | End: 2024-10-23

## 2024-10-23 VITALS
RESPIRATION RATE: 14 BRPM | TEMPERATURE: 97.4 F | BODY MASS INDEX: 32.68 KG/M2 | SYSTOLIC BLOOD PRESSURE: 142 MMHG | HEIGHT: 71 IN | HEART RATE: 79 BPM | WEIGHT: 233.4 LBS | OXYGEN SATURATION: 97 % | DIASTOLIC BLOOD PRESSURE: 94 MMHG

## 2024-10-23 DIAGNOSIS — B34.9 VIRAL ILLNESS: ICD-10-CM

## 2024-10-23 DIAGNOSIS — J40 BRONCHITIS: Primary | ICD-10-CM

## 2024-10-23 DIAGNOSIS — J20.9 ACUTE BRONCHITIS, UNSPECIFIED ORGANISM: ICD-10-CM

## 2024-10-23 LAB
FLUAV AG SPEC QL: NEGATIVE
FLUBV AG SPEC QL: NEGATIVE
S PYO AG THROAT QL: NEGATIVE
SARS-COV-2 RDRP RESP QL NAA+PROBE: NOT  DETECTED

## 2024-10-23 PROCEDURE — 87635 SARS-COV-2 COVID-19 AMP PRB: CPT

## 2024-10-23 PROCEDURE — 87804 INFLUENZA ASSAY W/OPTIC: CPT

## 2024-10-23 PROCEDURE — 99213 OFFICE O/P EST LOW 20 MIN: CPT

## 2024-10-23 PROCEDURE — 87651 STREP A DNA AMP PROBE: CPT

## 2024-10-23 RX ORDER — PREDNISONE 20 MG/1
20 TABLET ORAL 2 TIMES DAILY
Qty: 10 TABLET | Refills: 0 | Status: SHIPPED | OUTPATIENT
Start: 2024-10-23 | End: 2024-10-28

## 2024-10-23 RX ORDER — ALBUTEROL SULFATE 90 UG/1
2 INHALANT RESPIRATORY (INHALATION) 4 TIMES DAILY PRN
Qty: 18 G | Refills: 0 | Status: SHIPPED | OUTPATIENT
Start: 2024-10-23

## 2024-10-23 RX ORDER — AZITHROMYCIN 250 MG/1
TABLET, FILM COATED ORAL
Qty: 1 PACKET | Refills: 0 | Status: SHIPPED | OUTPATIENT
Start: 2024-10-23 | End: 2024-10-27

## 2024-10-23 ASSESSMENT — ENCOUNTER SYMPTOMS
CONSTIPATION: 0
NAUSEA: 0
EYE ITCHING: 0
BACK PAIN: 0
DIARRHEA: 0
SINUS PRESSURE: 1
EYE DISCHARGE: 0
WHEEZING: 1
COUGH: 1
ABDOMINAL PAIN: 0
EYE REDNESS: 1
VOMITING: 0
SORE THROAT: 1

## 2024-10-23 ASSESSMENT — PAIN DESCRIPTION - DESCRIPTORS: DESCRIPTORS: DULL

## 2024-10-23 ASSESSMENT — PAIN SCALES - GENERAL: PAINLEVEL_OUTOF10: 5

## 2024-10-23 ASSESSMENT — PAIN - FUNCTIONAL ASSESSMENT: PAIN_FUNCTIONAL_ASSESSMENT: 0-10

## 2024-10-23 ASSESSMENT — PAIN DESCRIPTION - LOCATION: LOCATION: OTHER (COMMENT)

## 2024-10-23 NOTE — ED PROVIDER NOTES
[facility] URGENT CARE  Urgent Care Encounter       CHIEF COMPLAINT       Chief Complaint   Patient presents with    Cough     Fatigue, aches, sore throat, congestion       Nurses Notes reviewed and I agree except as noted in the HPI.  HISTORY OF PRESENT ILLNESS   Jameson Baum is a 33 y.o. male who presents with 2-day history of fatigue body aches, sore throat cough and congestion.  Patient denies taking any medications over-the-counter to help with his symptoms.  Patient reports multiple ill contacts at work.  Patient reports he typically gets sick once a year,  but when he does he gets very sick and requires antibiotics and typically steroids to feel better.    The history is provided by the patient. No  was used.       REVIEW OF SYSTEMS     Review of Systems   Constitutional:  Positive for chills and fatigue. Negative for fever.   HENT:  Positive for congestion, postnasal drip, sinus pressure and sore throat. Negative for ear discharge and ear pain.    Eyes:  Positive for redness (patient reports redness around eyes- reports this is an allergic reaction to exposure at work). Negative for discharge and itching.   Respiratory:  Positive for cough and wheezing.    Cardiovascular:  Negative for chest pain.   Gastrointestinal:  Negative for abdominal pain, constipation, diarrhea, nausea and vomiting.   Endocrine: Negative for cold intolerance and heat intolerance.   Genitourinary:  Negative for dysuria.   Musculoskeletal:  Negative for arthralgias and back pain.   Skin:  Negative for rash.   Allergic/Immunologic: Negative for environmental allergies and food allergies.   Neurological:  Negative for dizziness and headaches.   Hematological:  Negative for adenopathy.   Psychiatric/Behavioral:  Negative for behavioral problems.        PAST MEDICAL HISTORY   History reviewed. No pertinent past medical history.    SURGICALHISTORY     Patient  has no past surgical history on       Breath sounds: Examination of the right-middle field reveals decreased breath sounds. Examination of the left-middle field reveals decreased breath sounds. Examination of the right-lower field reveals decreased breath sounds and rhonchi. Examination of the left-lower field reveals decreased breath sounds and rhonchi. Decreased breath sounds and rhonchi present.   Abdominal:      General: Abdomen is flat. Bowel sounds are normal.      Palpations: Abdomen is soft.   Musculoskeletal:         General: Normal range of motion.      Cervical back: Normal range of motion.   Skin:     General: Skin is warm and dry.      Capillary Refill: Capillary refill takes less than 2 seconds.   Neurological:      General: No focal deficit present.      Mental Status: He is alert and oriented to person, place, and time. Mental status is at baseline.   Psychiatric:         Mood and Affect: Mood normal.         Behavior: Behavior normal.         DIAGNOSTIC RESULTS     Labs:  Results for orders placed or performed during the hospital encounter of 10/23/24   COVID-19, Rapid    Specimen: Nasopharyngeal Swab   Result Value Ref Range    SARS-CoV-2, WARD NOT  DETECTED NOT DETECTED   Rapid influenza A/B antigens    Specimen: Nasopharyngeal   Result Value Ref Range    Flu A Antigen Negative NEGATIVE    Influenza B Ag, EIA Negative NEGATIVE   Strep Screen Group A Throat   Result Value Ref Range    Rapid Strep A Screen NEGATIVE        IMAGING:    No orders to display         EKG:      URGENT CARE COURSE:     Vitals:    10/23/24 1452   BP: (!) 142/94   Pulse: 79   Resp: 14   Temp: 97.4 °F (36.3 °C)   SpO2: 97%   Weight: 105.9 kg (233 lb 6.4 oz)   Height: 1.803 m (5' 11\")       Medications - No data to display         PROCEDURES:  None    FINAL IMPRESSION      1. Bronchitis    2. Acute bronchitis, unspecified organism    3. Viral illness          DISPOSITION/ PLAN       Patient presents with most likely bronchitis and viral illness.  Patient

## 2025-07-28 ENCOUNTER — APPOINTMENT (OUTPATIENT)
Dept: GENERAL RADIOLOGY | Age: 35
End: 2025-07-28
Payer: COMMERCIAL

## 2025-07-28 ENCOUNTER — HOSPITAL ENCOUNTER (EMERGENCY)
Age: 35
Discharge: HOME | End: 2025-07-28
Payer: COMMERCIAL

## 2025-07-28 VITALS
SYSTOLIC BLOOD PRESSURE: 145 MMHG | OXYGEN SATURATION: 98 % | BODY MASS INDEX: 33.47 KG/M2 | HEART RATE: 68 BPM | RESPIRATION RATE: 18 BRPM | WEIGHT: 240 LBS | TEMPERATURE: 98.4 F | DIASTOLIC BLOOD PRESSURE: 93 MMHG

## 2025-07-28 DIAGNOSIS — J40 BRONCHITIS: Primary | ICD-10-CM

## 2025-07-28 PROCEDURE — 6370000000 HC RX 637 (ALT 250 FOR IP)

## 2025-07-28 PROCEDURE — 71046 X-RAY EXAM CHEST 2 VIEWS: CPT

## 2025-07-28 PROCEDURE — 94640 AIRWAY INHALATION TREATMENT: CPT

## 2025-07-28 PROCEDURE — 99214 OFFICE O/P EST MOD 30 MIN: CPT

## 2025-07-28 PROCEDURE — 99213 OFFICE O/P EST LOW 20 MIN: CPT

## 2025-07-28 RX ORDER — BROMPHENIRAMINE MALEATE, PSEUDOEPHEDRINE HYDROCHLORIDE, AND DEXTROMETHORPHAN HYDROBROMIDE 2; 30; 10 MG/5ML; MG/5ML; MG/5ML
5 SYRUP ORAL 4 TIMES DAILY PRN
Qty: 118 ML | Refills: 0 | Status: SHIPPED | OUTPATIENT
Start: 2025-07-28

## 2025-07-28 RX ORDER — PREDNISONE 20 MG/1
20 TABLET ORAL 2 TIMES DAILY
Qty: 10 TABLET | Refills: 0 | Status: SHIPPED | OUTPATIENT
Start: 2025-07-28 | End: 2025-08-02

## 2025-07-28 RX ORDER — ALBUTEROL SULFATE 90 UG/1
2 INHALANT RESPIRATORY (INHALATION) 4 TIMES DAILY PRN
Qty: 54 G | Refills: 0 | Status: SHIPPED | OUTPATIENT
Start: 2025-07-28

## 2025-07-28 RX ORDER — IPRATROPIUM BROMIDE AND ALBUTEROL SULFATE 2.5; .5 MG/3ML; MG/3ML
1 SOLUTION RESPIRATORY (INHALATION) ONCE
Status: COMPLETED | OUTPATIENT
Start: 2025-07-28 | End: 2025-07-28

## 2025-07-28 RX ADMIN — IPRATROPIUM BROMIDE AND ALBUTEROL SULFATE 1 DOSE: .5; 3 SOLUTION RESPIRATORY (INHALATION) at 11:52

## 2025-07-28 ASSESSMENT — ENCOUNTER SYMPTOMS
COUGH: 1
SHORTNESS OF BREATH: 1

## 2025-07-28 ASSESSMENT — PAIN - FUNCTIONAL ASSESSMENT: PAIN_FUNCTIONAL_ASSESSMENT: NONE - DENIES PAIN

## 2025-07-28 NOTE — ED PROVIDER NOTES
[facility] URGENT CARE  Urgent Care Encounter       CHIEF COMPLAINT       Chief Complaint   Patient presents with    Cough     X3 weeks       Nurses Notes reviewed and I agree except as noted in the HPI.  HISTORY OF PRESENT ILLNESS   Jameson Baum is a 34 y.o. male who presents with concerns of a cough for the past three days. Patient reports use of Mucinex for symptom management.     HPI    REVIEW OF SYSTEMS     Review of Systems   Constitutional:  Positive for fatigue.   Respiratory:  Positive for cough and shortness of breath.    All other systems reviewed and are negative.      PAST MEDICAL HISTORY   History reviewed. No pertinent past medical history.    SURGICALHISTORY     Patient  has no past surgical history on file.    CURRENT MEDICATIONS       Previous Medications    ALBUTEROL SULFATE HFA (VENTOLIN HFA) 108 (90 BASE) MCG/ACT INHALER    Inhale 2 puffs into the lungs 4 times daily as needed for Wheezing    MECLIZINE (ANTIVERT) 25 MG CHEW    Take 1 tablet by mouth 3 times daily as needed (dizziness, or nausea)       ALLERGIES     Patient is has no known allergies.    Patients   Immunization History   Administered Date(s) Administered    COVID-19, J&J, (age 18y+), IM, 0.5 mL 04/10/2021    COVID-19, PFIZER Bivalent, DO NOT Dilute, (age 12y+), IM, 30 mcg/0.3 mL 12/29/2022    COVID-19, PFIZER PURPLE top, DILUTE for use, (age 12 y+), 30mcg/0.3mL 01/21/2022    TDaP, ADACEL (age 10y-64y), BOOSTRIX (age 10y+), IM, 0.5mL 02/17/2022, 05/12/2023       FAMILY HISTORY     Patient's family history includes Cancer in his mother; Other in his mother.    SOCIAL HISTORY     Patient  reports that he has never smoked. He has never used smokeless tobacco. He reports current alcohol use. He reports current drug use. Drug: Marijuana (Weed).    PHYSICAL EXAM     ED TRIAGE VITALS  BP: (!) 145/93, Temp: 98.4 °F (36.9 °C), Pulse: 68, Respirations: 18, SpO2: 98 %,Estimated body mass index is 33.47 kg/m² as calculated from the

## 2025-07-28 NOTE — DISCHARGE INSTRUCTIONS
Medication as prescribed.   Rest.  Increase water intake, frequent hand washing.  Tylenol / Ibuprofen as needed for fever and or pain.  Follow up with PCP in 3-5 days if no improvement or sooner with worsening symptoms.

## 2025-07-28 NOTE — ED TRIAGE NOTES
"Problem: Patient Care Overview  Goal: Plan of Care/Patient Progress Review  Outcome: No Change  Admission    Patient arrives to room 315-1 via wc from ED.  Care plan note:  ED admit @ 1715. Observation pt. A&Ox4. SBA/FR. BP elevated, Tachy with -118. Tele ST. Denies CP, but pt reported feeling like his heart is \"pumping fast\", also c/o anxiety, lightheadedness and dizziness. Prn Ativan 0.25 mg given, with improvement in HR to 90s-102. Reg diet, pt stated his family is bringing him supper. Skin intact. IVF infusing 100 mL/hr. Utox positive for Cocaine and Methamphetamine. Psych following.         Inpatient nursing criteria listed below were met:    PCD's Documented: Yes  Skin issues/needs documented :Yes  Isolation education started/completed NA  Patient allergies verified with patient: NA  Fall Prevention: Care plan updated, Education given and documented Yes  Care Plan initiated: Yes  Home medications documented in belongings flowsheet: Yes  Patient belongings documented in belongings flowsheet: Yes  Reminder note (belongings/ medications) placed in discharge instructions:Yes  Admission profile/ required documentation complete: Yes        " Problem: Patient Care Overview  Goal: Plan of Care/Patient Progress Review  Outcome: Adequate for Discharge Date Met: 05/20/18  Discharge    Patient discharged to home via taxi cab.  Care plan note  A&Ox4, VSS on RA. Tele NSR. PRN Tylenol x 1 for c/o HA with relief. Up independently, steady on feet. Received shower this shift. Regular diet with good appetite. Voiding adequately. Psych consulted. Ok to d/c home.    D/C instructions reviewed with pt,  present at bedside. Pt authorized release of information from hospital stay to psych MD in community, sent down to medical records, pt given copy of consent. Education handouts given to pt. No meds filled. Pt verbalizes understanding. D/C'd home via taxi cab, all belongings sent with.    Listed belongings gathered and returned to patient. Yes  Care Plan and Patient education resolved: Yes  Prescriptions if needed, hard copies sent with patient  NA  Home and hospital acquired medications returned to patient: NA  Medication Bin checked and emptied on discharge Yes  Follow up appointment made for patient: No       Problem: Patient Care Overview  Goal: Plan of Care/Patient Progress Review  Outcome: Improving  Pt. A&Ox4. Tele: ST . BP stabilizing was 148/101, currently 121/83, other VSS on RA. Reports feeling much better. Denies any pain/discomfort. IVF at 100ml/hr. Up with SBA. Has a psych consult . D/c pending       Pt ambulatory to [facility] with c/o cough x3 weeks. Reports having intermittent difficulty catching breath. No other concerns noted.    regular rate and rhythm